# Patient Record
Sex: FEMALE | Race: WHITE | NOT HISPANIC OR LATINO | URBAN - METROPOLITAN AREA
[De-identification: names, ages, dates, MRNs, and addresses within clinical notes are randomized per-mention and may not be internally consistent; named-entity substitution may affect disease eponyms.]

---

## 2017-06-20 ENCOUNTER — EMERGENCY (EMERGENCY)
Facility: HOSPITAL | Age: 52
LOS: 1 days | Discharge: OTHER INSTITUTION | End: 2017-06-20
Attending: EMERGENCY MEDICINE
Payer: COMMERCIAL

## 2017-06-20 VITALS
HEART RATE: 130 BPM | TEMPERATURE: 100 F | RESPIRATION RATE: 20 BRPM | HEIGHT: 64 IN | DIASTOLIC BLOOD PRESSURE: 85 MMHG | WEIGHT: 154.98 LBS | SYSTOLIC BLOOD PRESSURE: 141 MMHG | OXYGEN SATURATION: 98 %

## 2017-06-20 LAB
ALBUMIN SERPL ELPH-MCNC: 4.3 G/DL — SIGNIFICANT CHANGE UP (ref 3.3–5.2)
ALP SERPL-CCNC: 79 U/L — SIGNIFICANT CHANGE UP (ref 40–120)
ALT FLD-CCNC: 16 U/L — SIGNIFICANT CHANGE UP
ANION GAP SERPL CALC-SCNC: 18 MMOL/L — HIGH (ref 5–17)
APPEARANCE UR: CLEAR — SIGNIFICANT CHANGE UP
AST SERPL-CCNC: 18 U/L — SIGNIFICANT CHANGE UP
BASOPHILS # BLD AUTO: 0 K/UL — SIGNIFICANT CHANGE UP (ref 0–0.2)
BASOPHILS NFR BLD AUTO: 0.2 % — SIGNIFICANT CHANGE UP (ref 0–2)
BILIRUB SERPL-MCNC: 0.8 MG/DL — SIGNIFICANT CHANGE UP (ref 0.4–2)
BILIRUB UR-MCNC: NEGATIVE — SIGNIFICANT CHANGE UP
BUN SERPL-MCNC: 10 MG/DL — SIGNIFICANT CHANGE UP (ref 8–20)
CALCIUM SERPL-MCNC: 9.7 MG/DL — SIGNIFICANT CHANGE UP (ref 8.6–10.2)
CHLORIDE SERPL-SCNC: 94 MMOL/L — LOW (ref 98–107)
CO2 SERPL-SCNC: 24 MMOL/L — SIGNIFICANT CHANGE UP (ref 22–29)
COLOR SPEC: YELLOW — SIGNIFICANT CHANGE UP
CREAT SERPL-MCNC: 0.55 MG/DL — SIGNIFICANT CHANGE UP (ref 0.5–1.3)
DIFF PNL FLD: ABNORMAL
EOSINOPHIL # BLD AUTO: 0 K/UL — SIGNIFICANT CHANGE UP (ref 0–0.5)
EOSINOPHIL NFR BLD AUTO: 0.1 % — SIGNIFICANT CHANGE UP (ref 0–6)
EPI CELLS # UR: SIGNIFICANT CHANGE UP
GLUCOSE SERPL-MCNC: 207 MG/DL — HIGH (ref 70–115)
GLUCOSE UR QL: NEGATIVE MG/DL — SIGNIFICANT CHANGE UP
HCT VFR BLD CALC: 40.5 % — SIGNIFICANT CHANGE UP (ref 37–47)
HGB BLD-MCNC: 13.6 G/DL — SIGNIFICANT CHANGE UP (ref 12–16)
KETONES UR-MCNC: NEGATIVE — SIGNIFICANT CHANGE UP
LACTATE BLDV-MCNC: 1.7 MMOL/L — SIGNIFICANT CHANGE UP (ref 0.5–2)
LEUKOCYTE ESTERASE UR-ACNC: ABNORMAL
LYMPHOCYTES # BLD AUTO: 14.2 % — LOW (ref 20–55)
LYMPHOCYTES # BLD AUTO: 2.5 K/UL — SIGNIFICANT CHANGE UP (ref 1–4.8)
MCHC RBC-ENTMCNC: 30.1 PG — SIGNIFICANT CHANGE UP (ref 27–31)
MCHC RBC-ENTMCNC: 33.6 G/DL — SIGNIFICANT CHANGE UP (ref 32–36)
MCV RBC AUTO: 89.6 FL — SIGNIFICANT CHANGE UP (ref 81–99)
MONOCYTES # BLD AUTO: 1.1 K/UL — HIGH (ref 0–0.8)
MONOCYTES NFR BLD AUTO: 6.4 % — SIGNIFICANT CHANGE UP (ref 3–10)
NEUTROPHILS # BLD AUTO: 13.8 K/UL — HIGH (ref 1.8–8)
NEUTROPHILS NFR BLD AUTO: 78.8 % — HIGH (ref 37–73)
NITRITE UR-MCNC: NEGATIVE — SIGNIFICANT CHANGE UP
PH UR: 7 — SIGNIFICANT CHANGE UP (ref 5–8)
PLATELET # BLD AUTO: 323 K/UL — SIGNIFICANT CHANGE UP (ref 150–400)
POTASSIUM SERPL-MCNC: 3.5 MMOL/L — SIGNIFICANT CHANGE UP (ref 3.5–5.3)
POTASSIUM SERPL-SCNC: 3.5 MMOL/L — SIGNIFICANT CHANGE UP (ref 3.5–5.3)
PROT SERPL-MCNC: 8.3 G/DL — SIGNIFICANT CHANGE UP (ref 6.6–8.7)
PROT UR-MCNC: NEGATIVE MG/DL — SIGNIFICANT CHANGE UP
RAPID RVP RESULT: SIGNIFICANT CHANGE UP
RBC # BLD: 4.52 M/UL — SIGNIFICANT CHANGE UP (ref 4.4–5.2)
RBC # FLD: 13.2 % — SIGNIFICANT CHANGE UP (ref 11–15.6)
RBC CASTS # UR COMP ASSIST: ABNORMAL /HPF (ref 0–4)
SODIUM SERPL-SCNC: 136 MMOL/L — SIGNIFICANT CHANGE UP (ref 135–145)
SP GR SPEC: 1 — LOW (ref 1.01–1.02)
UROBILINOGEN FLD QL: NEGATIVE MG/DL — SIGNIFICANT CHANGE UP
WBC # BLD: 17.5 K/UL — HIGH (ref 4.8–10.8)
WBC # FLD AUTO: 17.5 K/UL — HIGH (ref 4.8–10.8)
WBC UR QL: SIGNIFICANT CHANGE UP

## 2017-06-20 PROCEDURE — 70491 CT SOFT TISSUE NECK W/DYE: CPT | Mod: 26

## 2017-06-20 PROCEDURE — 71020: CPT | Mod: 26

## 2017-06-20 PROCEDURE — 99291 CRITICAL CARE FIRST HOUR: CPT

## 2017-06-20 RX ORDER — SODIUM CHLORIDE 9 MG/ML
500 INJECTION INTRAMUSCULAR; INTRAVENOUS; SUBCUTANEOUS
Qty: 0 | Refills: 0 | Status: COMPLETED | OUTPATIENT
Start: 2017-06-20 | End: 2017-06-20

## 2017-06-20 RX ORDER — ACETAMINOPHEN 500 MG
1000 TABLET ORAL ONCE
Qty: 0 | Refills: 0 | Status: COMPLETED | OUTPATIENT
Start: 2017-06-20 | End: 2017-06-20

## 2017-06-20 RX ORDER — SODIUM CHLORIDE 9 MG/ML
3 INJECTION INTRAMUSCULAR; INTRAVENOUS; SUBCUTANEOUS ONCE
Qty: 0 | Refills: 0 | Status: COMPLETED | OUTPATIENT
Start: 2017-06-20 | End: 2017-06-20

## 2017-06-20 RX ORDER — KETOROLAC TROMETHAMINE 30 MG/ML
30 SYRINGE (ML) INJECTION ONCE
Qty: 0 | Refills: 0 | Status: DISCONTINUED | OUTPATIENT
Start: 2017-06-20 | End: 2017-06-20

## 2017-06-20 RX ADMIN — SODIUM CHLORIDE 2000 MILLILITER(S): 9 INJECTION INTRAMUSCULAR; INTRAVENOUS; SUBCUTANEOUS at 22:16

## 2017-06-20 RX ADMIN — SODIUM CHLORIDE 2000 MILLILITER(S): 9 INJECTION INTRAMUSCULAR; INTRAVENOUS; SUBCUTANEOUS at 20:44

## 2017-06-20 RX ADMIN — SODIUM CHLORIDE 2000 MILLILITER(S): 9 INJECTION INTRAMUSCULAR; INTRAVENOUS; SUBCUTANEOUS at 22:15

## 2017-06-20 RX ADMIN — Medication 400 MILLIGRAM(S): at 20:44

## 2017-06-20 RX ADMIN — SODIUM CHLORIDE 2000 MILLILITER(S): 9 INJECTION INTRAMUSCULAR; INTRAVENOUS; SUBCUTANEOUS at 20:05

## 2017-06-20 RX ADMIN — Medication 100 MILLIGRAM(S): at 20:44

## 2017-06-20 RX ADMIN — SODIUM CHLORIDE 3 MILLILITER(S): 9 INJECTION INTRAMUSCULAR; INTRAVENOUS; SUBCUTANEOUS at 19:47

## 2017-06-20 NOTE — ED PROVIDER NOTE - OBJECTIVE STATEMENT
50 y/o female presents to the ED c/o difficulty swallowing that onset 4 days ago. Pt states that she has been having difficulty swallowing and that cold water makes sx better. Went to her PCP about 1.5 weeks ago and was diagnosed with shingles due to a rash on her back. She was started on abx. Went to [facility] 2 weeks ago. Denies difficulty breathing, numbness, tingling, fever, chills or HA. No further complaints at this time.

## 2017-06-20 NOTE — ED PROVIDER NOTE - PROGRESS NOTE DETAILS
Spoke with Dr. Gupta, ENT at [facility]. Awaiting call back. Will transfer when callback. Dr. Moncada will be accepting physician.

## 2017-06-20 NOTE — ED PROVIDER NOTE - CRITICAL CARE PROVIDED
documentation/consult w/ pt's family directly relating to pts condition/additional history taking/consultation with other physicians/interpretation of diagnostic studies/direct patient care (not related to procedure)

## 2017-06-20 NOTE — ED ADULT NURSE NOTE - OBJECTIVE STATEMENT
patient states that she has difficulty swallowing which started on friday and increased in difficulty, she states that she was unable to eat today. Patient states that she was also diagnosed with shingles 2 weeks ago

## 2017-06-20 NOTE — ED ADULT NURSE REASSESSMENT NOTE - NS ED NURSE REASSESS COMMENT FT1
Pt resting comfortably on stretcher, A&Ox3, reports feeling better than before, VSS, family at bedside providing support, POC discussed, awaiting transport to CT, CT called transport in progress. Safety and comfort measures in place.

## 2017-06-21 ENCOUNTER — INPATIENT (INPATIENT)
Facility: HOSPITAL | Age: 52
LOS: 0 days | Discharge: HOME | End: 2017-06-22
Attending: OTOLARYNGOLOGY | Admitting: OTOLARYNGOLOGY

## 2017-06-21 VITALS
HEART RATE: 102 BPM | TEMPERATURE: 99 F | RESPIRATION RATE: 20 BRPM | DIASTOLIC BLOOD PRESSURE: 71 MMHG | OXYGEN SATURATION: 97 % | SYSTOLIC BLOOD PRESSURE: 125 MMHG

## 2017-06-21 VITALS
RESPIRATION RATE: 16 BRPM | SYSTOLIC BLOOD PRESSURE: 148 MMHG | TEMPERATURE: 98 F | OXYGEN SATURATION: 100 % | HEART RATE: 75 BPM | DIASTOLIC BLOOD PRESSURE: 99 MMHG

## 2017-06-21 DIAGNOSIS — R22.1 LOCALIZED SWELLING, MASS AND LUMP, NECK: ICD-10-CM

## 2017-06-21 PROCEDURE — 87486 CHLMYD PNEUM DNA AMP PROBE: CPT

## 2017-06-21 PROCEDURE — 70491 CT SOFT TISSUE NECK W/DYE: CPT

## 2017-06-21 PROCEDURE — 80053 COMPREHEN METABOLIC PANEL: CPT

## 2017-06-21 PROCEDURE — 96374 THER/PROPH/DIAG INJ IV PUSH: CPT | Mod: XU

## 2017-06-21 PROCEDURE — 99285 EMERGENCY DEPT VISIT HI MDM: CPT | Mod: 25

## 2017-06-21 PROCEDURE — 96375 TX/PRO/DX INJ NEW DRUG ADDON: CPT | Mod: XU

## 2017-06-21 PROCEDURE — 85027 COMPLETE CBC AUTOMATED: CPT

## 2017-06-21 PROCEDURE — 87633 RESP VIRUS 12-25 TARGETS: CPT

## 2017-06-21 PROCEDURE — 81001 URINALYSIS AUTO W/SCOPE: CPT

## 2017-06-21 PROCEDURE — 87040 BLOOD CULTURE FOR BACTERIA: CPT

## 2017-06-21 PROCEDURE — 87798 DETECT AGENT NOS DNA AMP: CPT

## 2017-06-21 PROCEDURE — 83605 ASSAY OF LACTIC ACID: CPT

## 2017-06-21 PROCEDURE — 71046 X-RAY EXAM CHEST 2 VIEWS: CPT

## 2017-06-21 PROCEDURE — 36415 COLL VENOUS BLD VENIPUNCTURE: CPT

## 2017-06-21 PROCEDURE — 87086 URINE CULTURE/COLONY COUNT: CPT

## 2017-06-21 PROCEDURE — 87581 M.PNEUMON DNA AMP PROBE: CPT

## 2017-06-21 RX ORDER — DEXAMETHASONE 0.5 MG/5ML
10 ELIXIR ORAL ONCE
Qty: 0 | Refills: 0 | Status: COMPLETED | OUTPATIENT
Start: 2017-06-21 | End: 2017-06-21

## 2017-06-21 RX ORDER — MORPHINE SULFATE 50 MG/1
3 CAPSULE, EXTENDED RELEASE ORAL EVERY 4 HOURS
Qty: 0 | Refills: 0 | Status: DISCONTINUED | OUTPATIENT
Start: 2017-06-21 | End: 2017-06-22

## 2017-06-21 RX ORDER — DEXAMETHASONE 0.5 MG/5ML
8 ELIXIR ORAL EVERY 8 HOURS
Qty: 0 | Refills: 0 | Status: COMPLETED | OUTPATIENT
Start: 2017-06-21 | End: 2017-06-22

## 2017-06-21 RX ORDER — HEPARIN SODIUM 5000 [USP'U]/ML
5000 INJECTION INTRAVENOUS; SUBCUTANEOUS EVERY 12 HOURS
Qty: 0 | Refills: 0 | Status: DISCONTINUED | OUTPATIENT
Start: 2017-06-21 | End: 2017-06-22

## 2017-06-21 RX ORDER — LIDOCAINE HCL 20 MG/ML
3 VIAL (ML) INJECTION ONCE
Qty: 0 | Refills: 0 | Status: COMPLETED | OUTPATIENT
Start: 2017-06-21 | End: 2017-06-21

## 2017-06-21 RX ORDER — MORPHINE SULFATE 50 MG/1
2 CAPSULE, EXTENDED RELEASE ORAL EVERY 4 HOURS
Qty: 0 | Refills: 0 | Status: DISCONTINUED | OUTPATIENT
Start: 2017-06-21 | End: 2017-06-22

## 2017-06-21 RX ORDER — SODIUM CHLORIDE 9 MG/ML
1000 INJECTION, SOLUTION INTRAVENOUS
Qty: 0 | Refills: 0 | Status: DISCONTINUED | OUTPATIENT
Start: 2017-06-21 | End: 2017-06-22

## 2017-06-21 RX ORDER — MORPHINE SULFATE 50 MG/1
4 CAPSULE, EXTENDED RELEASE ORAL EVERY 4 HOURS
Qty: 0 | Refills: 0 | Status: DISCONTINUED | OUTPATIENT
Start: 2017-06-21 | End: 2017-06-22

## 2017-06-21 RX ADMIN — Medication 101.6 MILLIGRAM(S): at 22:37

## 2017-06-21 RX ADMIN — Medication 100 MILLIGRAM(S): at 13:39

## 2017-06-21 RX ADMIN — Medication 101.6 MILLIGRAM(S): at 06:30

## 2017-06-21 RX ADMIN — SODIUM CHLORIDE 100 MILLILITER(S): 9 INJECTION, SOLUTION INTRAVENOUS at 05:16

## 2017-06-21 RX ADMIN — SODIUM CHLORIDE 100 MILLILITER(S): 9 INJECTION, SOLUTION INTRAVENOUS at 18:50

## 2017-06-21 RX ADMIN — Medication 10 MILLIGRAM(S): at 01:19

## 2017-06-21 RX ADMIN — HEPARIN SODIUM 5000 UNIT(S): 5000 INJECTION INTRAVENOUS; SUBCUTANEOUS at 18:20

## 2017-06-21 RX ADMIN — Medication 101.6 MILLIGRAM(S): at 14:39

## 2017-06-21 RX ADMIN — Medication 100 MILLIGRAM(S): at 22:37

## 2017-06-21 RX ADMIN — HEPARIN SODIUM 5000 UNIT(S): 5000 INJECTION INTRAVENOUS; SUBCUTANEOUS at 06:31

## 2017-06-21 RX ADMIN — Medication 100 MILLIGRAM(S): at 07:13

## 2017-06-21 RX ADMIN — Medication 3 MILLILITER(S): at 04:00

## 2017-06-21 NOTE — DISCHARGE NOTE ADULT - CARE PLAN
Principal Discharge DX:	Neck swelling  Goal:	IV antibiotics  Instructions for follow-up, activity and diet:	diet: Principal Discharge DX:	Neck swelling  Goal:	IV antibiotics  Instructions for follow-up, activity and diet:	diet: soft

## 2017-06-21 NOTE — ED PROVIDER NOTE - ATTENDING CONTRIBUTION TO CARE
Dr. Griffith: I performed a face to face bedside interview with patient regarding history of present illness, review of symptoms and past medical history. I completed an independent physical exam.  I have discussed patient's plan of care with PA.   I agree with note as stated above, having amended the EMR as needed to reflect my findings.   This includes HISTORY OF PRESENT ILLNESS, HIV, PAST MEDICAL/SURGICAL/FAMILY/SOCIAL HISTORY, ALLERGIES AND HOME MEDICATIONS, REVIEW OF SYSTEMS, PHYSICAL EXAM, and any PROGRESS NOTES during the time I functioned as the attending physician for this patient.    52 yo F with neck swelling Dr. Griffith: I performed a face to face bedside interview with patient regarding history of present illness, review of symptoms and past medical history. I completed an independent physical exam.  I have discussed patient's plan of care with PA.   I agree with note as stated above, having amended the EMR as needed to reflect my findings.   This includes HISTORY OF PRESENT ILLNESS, HIV, PAST MEDICAL/SURGICAL/FAMILY/SOCIAL HISTORY, ALLERGIES AND HOME MEDICATIONS, REVIEW OF SYSTEMS, PHYSICAL EXAM, and any PROGRESS NOTES during the time I functioned as the attending physician for this patient.    50 yo F with neck swelling. no stridor or respiratory distress pt has trismius   ***GEN - NAD; well appearing; A+O x3 ***HEAD - NC/AT ***EYES/NOSE - PEERL, EOMI, mucous membranes moist, no discharge ***THROAT: difficult to exam due to limited opening of mouth no drooling .  ***NECK: Neck supple, edema to the left neck and the mandibular area  ***PULMONARY - CTA b/l, symmetric breath sounds. ***CARDIAC -s1s2, RRR, no M,G,R  ***ABDOMEN - +BS, ND, NT, soft***EXTREMITIES - symmetric pulses no edema ***SKIN - no rash or bruising ***NEUROLOGIC - alert, follows commands ***PSYCH - insight and judgment nl  plan ENT consulted and will admit pt

## 2017-06-21 NOTE — DISCHARGE NOTE ADULT - INSTRUCTIONS
discharged to home with written prescriptions for percocet, medrol dose pack and clindamycin. will follow up as an outpatient. may return to ER for shortness of breath. continue with soft diet

## 2017-06-21 NOTE — DISCHARGE NOTE ADULT - MEDICATION SUMMARY - MEDICATIONS TO TAKE
I will START or STAY ON the medications listed below when I get home from the hospital:    Medrol 2 mg oral tablet  -- use as directed on script  -- It is very important that you take or use this exactly as directed.  Do not skip doses or discontinue unless directed by your doctor.  Obtain medical advice before taking any non-prescription drugs as some may affect the action of this medication.  Take with food or milk.    -- Indication: For swelling    Percocet 5/325 325 mg-5 mg oral tablet  -- 1 tab(s) by mouth every 6 hours MDD:4  -- Caution federal law prohibits the transfer of this drug to any person other  than the person for whom it was prescribed.  May cause drowsiness.  Alcohol may intensify this effect.  Use care when operating dangerous machinery.  This prescription cannot be refilled.  This product contains acetaminophen.  Do not use  with any other product containing acetaminophen to prevent possible liver damage.  Using more of this medication than prescribed may cause serious breathing problems.    -- Indication: For pain    valACYclovir 1 g oral tablet  -- 1 tab(s) by mouth every 8 hours  -- Indication: For outpt med    Cleocin HCl 300 mg oral capsule  -- 1 cap(s) by mouth every 6 hours  -- Finish all this medication unless otherwise directed by prescriber.  Medication should be taken with plenty of water.    -- Indication: For prophylaxis

## 2017-06-21 NOTE — DISCHARGE NOTE ADULT - CARE PROVIDER_API CALL
Jax Gupta), Otolaryngology  [address] [place] [address]  Phone: [phone]  Fax: [phone]

## 2017-06-21 NOTE — ED PROVIDER NOTE - OBJECTIVE STATEMENT
50 y/o female no pmh ALL: to [facility] presents s/p transfer from [place], states that she has had some left neck swelling x 4 days, thought that she had a fat neck, but today was worse + difficultly swallowing and more pain so went to ER, there CT svan showed a peripharyngeal fluid collection + swelling of epiglottitis, + leukocytosis of 17. pt denies any headaches, neck pain, f/c/n/v/d, diff breathing, chest pain, sob, abdominal pain, urinary symptoms, numbness/weakness/tingling, recent travel, sick contact, social history. was given clinda + decadron before coming.

## 2017-06-21 NOTE — DISCHARGE NOTE ADULT - HOSPITAL COURSE
51 year old female with parapharyngeal phlegmon. Treated with IV antibiotics... 51 year old female with parapharyngeal phlegmon. Treated with IV antibiotics and then discharged home on oral clindamycin on 6/22/17.

## 2017-06-21 NOTE — H&P ADULT - HISTORY OF PRESENT ILLNESS
50 y/o female no pmh  presents s/p transfer from [place], states that she has had some left neck swelling x 4 days, today had worsening swallowing and more pain so went to ER.    Pt denies any headaches, neck pain, f/c/n/v/d, diff breathing, chest pain, sob, abdominal pain, urinary symptoms, numbness/weakness/tingling, recent travel, sick contact, social history. was given clinda + decadron before coming.    No difficulty breathing, does have odynophagia.    CT shows diffuse thickening without discrete abscess.    ALL: Penicillin    REVIEW OF SYSTEMS:  As in HPI                        13.6   17.5  )-----------( 323      ( 20 Jun 2017 19:58 )             40.5     06-20    136  |  94<L>  |  10.0  ----------------------------<  207<H>  3.5   |  24.0  |  0.55    Ca    9.7      20 Jun 2017 19:58    TPro  8.3  /  Alb  4.3  /  TBili  0.8  /  DBili  x   /  AST  18  /  ALT  16  /  AlkPhos  79  06-20    Vital Signs Last 24 Hrs  T(C): 36.7, Max: 36.7 (06-21 @ 02:41)  T(F): 98.1, Max: 98.1 (06-21 @ 02:41)  HR: 75 (75 - 75)  BP: 148/99 (148/99 - 148/99)    PHYSICAL EXAM:  Constitutional Normal: well nourished, well developed, non-dysmorphic, no acute distress  Psychiatric: age appropriate behavior, cooperative  Skin: no obvious skin lesions    Left EAC: Normal, No cerumen, no exostoses   Right EAC: Normal, No cerumen, no exostoses     Left Tympanic Membrane: Normal, Clear, No effusion  Right Tympanic Membrane: Normal, Clear, No effusion			    External Nose:  Normal, no structural deformities  Anterior Nasal Cavity:	Normal mucosa, no turbinate hypertrophy, straight septum  Oral Cavity:  Good dentition, tongue midline, L Palatal bulge noted, uvula deviated to R. ++ Trismus  Neck: + [place] LAD BL  Pulmonary: No Acute Distress.   Neurologic: awake and alert    Fiberoptic Laryngoscopy-  Adenoids nonhypertrophied and non edematous, epiglottis with mild lingual edema, vocal cords mobile bilaterally with no lesions or edema    3 cc 1% lidocaine with 1:100,000 epi injected in L palate. Incision made, clamp used to further open. No purulent material released. Pt tolerated attempt at drainage well.    A/P: 52 yo F with L parapharyngeal phlegmon vs. pharyngitis  -Clinda  -Decadron  -admit for obs  -d/w attending

## 2017-06-21 NOTE — ED ADULT NURSE NOTE - ED STAT RN HANDOFF DETAILS
Report given to LES Mitchell in RADS 1, pt. appears stable, no respiratory distress noted at this time.  Pt. awaiting transport to the unit.

## 2017-06-21 NOTE — ED ADULT TRIAGE NOTE - CHIEF COMPLAINT QUOTE
Pt arrives as transfer from [facility] for ENT. Accepted by Dr. Gupta. Recently diagnosed with shingles and presents w/ throat swelling causing difficulty swallowing.

## 2017-06-21 NOTE — ED ADULT NURSE REASSESSMENT NOTE - NS ED NURSE REASSESS COMMENT FT1
Report given to accepting RN, Loren [facility] ED, pt understands POC to be transferred and agrees with POC, pt made call to family who will visit in the am.

## 2017-06-21 NOTE — DISCHARGE NOTE ADULT - PATIENT PORTAL LINK FT
“You can access the [employer] Patient Portal, offered by [facility], by registering with the following website: [email or web address]”

## 2017-06-21 NOTE — ED ADULT NURSE NOTE - OBJECTIVE STATEMENT
rec'd pt as a xfer from [facility] for an ENT consult for throat swelling.  Pt.'s neck appears swollen upon arrival, no stridor or respiratory distress noted, states swelling started approximately 6 days ago and has since progressed.  Pt reports pain when swallowing but denies SOB.  Pt. arrives with a 20G IV saline lock in the R wrist and a 20G IV saline lock to the L AC.  Pt. denies fever/chills.  Vital signs stable. Pt. awaiting ENT consult.  Will continue to monitor.

## 2017-06-22 VITALS
SYSTOLIC BLOOD PRESSURE: 120 MMHG | OXYGEN SATURATION: 95 % | DIASTOLIC BLOOD PRESSURE: 73 MMHG | HEART RATE: 95 BPM | RESPIRATION RATE: 17 BRPM | TEMPERATURE: 98 F

## 2017-06-22 LAB
BUN SERPL-MCNC: 9 MG/DL — SIGNIFICANT CHANGE UP (ref 7–23)
CALCIUM SERPL-MCNC: 9.2 MG/DL — SIGNIFICANT CHANGE UP (ref 8.4–10.5)
CHLORIDE SERPL-SCNC: 100 MMOL/L — SIGNIFICANT CHANGE UP (ref 98–107)
CO2 SERPL-SCNC: 24 MMOL/L — SIGNIFICANT CHANGE UP (ref 22–31)
CREAT SERPL-MCNC: 0.6 MG/DL — SIGNIFICANT CHANGE UP (ref 0.5–1.3)
CULTURE RESULTS: SIGNIFICANT CHANGE UP
GLUCOSE SERPL-MCNC: 193 MG/DL — HIGH (ref 70–99)
HCT VFR BLD CALC: 34.8 % — SIGNIFICANT CHANGE UP (ref 34.5–45)
HGB BLD-MCNC: 11.1 G/DL — LOW (ref 11.5–15.5)
MAGNESIUM SERPL-MCNC: 2 MG/DL — SIGNIFICANT CHANGE UP (ref 1.6–2.6)
MCHC RBC-ENTMCNC: 29.1 PG — SIGNIFICANT CHANGE UP (ref 27–34)
MCHC RBC-ENTMCNC: 31.9 % — LOW (ref 32–36)
MCV RBC AUTO: 91.3 FL — SIGNIFICANT CHANGE UP (ref 80–100)
PHOSPHATE SERPL-MCNC: 3.3 MG/DL — SIGNIFICANT CHANGE UP (ref 2.5–4.5)
PLATELET # BLD AUTO: 329 K/UL — SIGNIFICANT CHANGE UP (ref 150–400)
PMV BLD: 10.6 FL — SIGNIFICANT CHANGE UP (ref 7–13)
POTASSIUM SERPL-MCNC: 3.5 MMOL/L — SIGNIFICANT CHANGE UP (ref 3.5–5.3)
POTASSIUM SERPL-SCNC: 3.5 MMOL/L — SIGNIFICANT CHANGE UP (ref 3.5–5.3)
RBC # BLD: 3.81 M/UL — SIGNIFICANT CHANGE UP (ref 3.8–5.2)
RBC # FLD: 13.1 % — SIGNIFICANT CHANGE UP (ref 10.3–14.5)
SODIUM SERPL-SCNC: 142 MMOL/L — SIGNIFICANT CHANGE UP (ref 135–145)
SPECIMEN SOURCE: SIGNIFICANT CHANGE UP
WBC # BLD: 21.51 K/UL — HIGH (ref 3.8–10.5)
WBC # FLD AUTO: 21.51 K/UL — HIGH (ref 3.8–10.5)

## 2017-06-22 RX ADMIN — HEPARIN SODIUM 5000 UNIT(S): 5000 INJECTION INTRAVENOUS; SUBCUTANEOUS at 06:08

## 2017-06-22 RX ADMIN — Medication 101.6 MILLIGRAM(S): at 06:08

## 2017-06-22 RX ADMIN — Medication 100 MILLIGRAM(S): at 06:08

## 2017-06-22 RX ADMIN — Medication 100 MILLIGRAM(S): at 13:30

## 2017-06-22 NOTE — PROGRESS NOTE ADULT - SUBJECTIVE AND OBJECTIVE BOX
sen and examined  pain improving  tolerating CLD    nad  awake, alert  +trimus but improved  OC/OP: no soft palate swelling, no bleeding, improved L pharyngeal swelling  neck: soft, flat    A/P:  -continue clinda  -advance diet as tolerated  -pain control  -dc later today

## 2017-06-25 LAB
CULTURE RESULTS: SIGNIFICANT CHANGE UP
CULTURE RESULTS: SIGNIFICANT CHANGE UP
SPECIMEN SOURCE: SIGNIFICANT CHANGE UP
SPECIMEN SOURCE: SIGNIFICANT CHANGE UP

## 2017-07-01 RX ORDER — AZITHROMYCIN 500 MG/1
0 TABLET, FILM COATED ORAL
Qty: 0 | Refills: 0 | COMMUNITY

## 2017-07-11 ENCOUNTER — FORM ENCOUNTER (OUTPATIENT)
Age: 52
End: 2017-07-11

## 2017-07-12 ENCOUNTER — OUTPATIENT (OUTPATIENT)
Dept: OUTPATIENT SERVICES | Facility: HOSPITAL | Age: 52
LOS: 1 days | End: 2017-07-12
Payer: COMMERCIAL

## 2017-07-12 ENCOUNTER — APPOINTMENT (OUTPATIENT)
Dept: OTOLARYNGOLOGY | Facility: CLINIC | Age: 52
End: 2017-07-12

## 2017-07-12 ENCOUNTER — APPOINTMENT (OUTPATIENT)
Dept: ULTRASOUND IMAGING | Facility: IMAGING CENTER | Age: 52
End: 2017-07-12

## 2017-07-12 VITALS
HEIGHT: 63 IN | WEIGHT: 155 LBS | HEART RATE: 89 BPM | DIASTOLIC BLOOD PRESSURE: 86 MMHG | SYSTOLIC BLOOD PRESSURE: 136 MMHG | BODY MASS INDEX: 27.46 KG/M2 | RESPIRATION RATE: 18 BRPM

## 2017-07-12 DIAGNOSIS — E04.2 NONTOXIC MULTINODULAR GOITER: ICD-10-CM

## 2017-07-12 DIAGNOSIS — Z87.730 PERSONAL HISTORY OF (CORRECTED) CLEFT LIP AND PALATE: ICD-10-CM

## 2017-07-12 DIAGNOSIS — Z80.1 FAMILY HISTORY OF MALIGNANT NEOPLASM OF TRACHEA, BRONCHUS AND LUNG: ICD-10-CM

## 2017-07-12 PROCEDURE — 76536 US EXAM OF HEAD AND NECK: CPT

## 2017-07-12 RX ORDER — CLINDAMYCIN HYDROCHLORIDE 300 MG/1
300 CAPSULE ORAL
Qty: 40 | Refills: 0 | Status: COMPLETED | COMMUNITY
Start: 2017-06-22

## 2017-07-12 RX ORDER — METHYLPREDNISOLONE 4 MG/1
4 TABLET ORAL
Qty: 21 | Refills: 0 | Status: COMPLETED | COMMUNITY
Start: 2017-06-22

## 2017-07-12 RX ORDER — VALACYCLOVIR 1 G/1
1 TABLET, FILM COATED ORAL
Qty: 30 | Refills: 0 | Status: COMPLETED | COMMUNITY
Start: 2017-06-09

## 2017-08-09 ENCOUNTER — FORM ENCOUNTER (OUTPATIENT)
Age: 52
End: 2017-08-09

## 2017-08-10 ENCOUNTER — OUTPATIENT (OUTPATIENT)
Dept: OUTPATIENT SERVICES | Facility: HOSPITAL | Age: 52
LOS: 1 days | End: 2017-08-10
Payer: COMMERCIAL

## 2017-08-10 ENCOUNTER — RESULT REVIEW (OUTPATIENT)
Age: 52
End: 2017-08-10

## 2017-08-10 ENCOUNTER — APPOINTMENT (OUTPATIENT)
Dept: ULTRASOUND IMAGING | Facility: IMAGING CENTER | Age: 52
End: 2017-08-10
Payer: COMMERCIAL

## 2017-08-10 DIAGNOSIS — E04.2 NONTOXIC MULTINODULAR GOITER: ICD-10-CM

## 2017-08-10 PROCEDURE — 10022: CPT

## 2017-08-10 PROCEDURE — 76942 ECHO GUIDE FOR BIOPSY: CPT | Mod: 26

## 2017-08-10 PROCEDURE — 76942 ECHO GUIDE FOR BIOPSY: CPT

## 2017-08-10 PROCEDURE — 88173 CYTOPATH EVAL FNA REPORT: CPT | Mod: 26

## 2017-08-11 ENCOUNTER — APPOINTMENT (OUTPATIENT)
Dept: OTOLARYNGOLOGY | Facility: CLINIC | Age: 52
End: 2017-08-11
Payer: COMMERCIAL

## 2017-08-11 VITALS
BODY MASS INDEX: 27.46 KG/M2 | DIASTOLIC BLOOD PRESSURE: 88 MMHG | SYSTOLIC BLOOD PRESSURE: 136 MMHG | HEART RATE: 89 BPM | HEIGHT: 63 IN | WEIGHT: 155 LBS

## 2017-08-11 DIAGNOSIS — J06.9 ACUTE UPPER RESPIRATORY INFECTION, UNSPECIFIED: ICD-10-CM

## 2017-08-11 DIAGNOSIS — Z09 ENCOUNTER FOR FOLLOW-UP EXAMINATION AFTER COMPLETED TREATMENT FOR CONDITIONS OTHER THAN MALIGNANT NEOPLASM: ICD-10-CM

## 2017-08-11 PROCEDURE — 31575 DIAGNOSTIC LARYNGOSCOPY: CPT

## 2017-08-11 PROCEDURE — 99215 OFFICE O/P EST HI 40 MIN: CPT | Mod: 25

## 2017-08-11 RX ORDER — ERYTHROMYCIN 333 MG/1
333 TABLET, DELAYED RELEASE ORAL 3 TIMES DAILY
Qty: 30 | Refills: 1 | Status: COMPLETED | COMMUNITY
Start: 2017-07-12 | End: 2017-08-11

## 2017-08-14 LAB — NON-GYNECOLOGICAL CYTOLOGY STUDY: SIGNIFICANT CHANGE UP

## 2018-05-01 NOTE — ED PROVIDER NOTE - ATTESTATION, MLM
Transport delayed and patient is stable.  ON room air tolerating liquids and snacks.  Discharge paperwork completed and reviewed with patient and wife.  Patient taken out via wheelchair.   I have reviewed and confirmed nurses' notes for patient's medications, allergies, medical history, and surgical history.

## 2018-10-03 NOTE — ED PROVIDER NOTE - NS ED NOTE AC HIGH RISK COUNTRIES
No Physician Pre-Sedation Assessment    Pre-Sedation Assessment:    Sedation History: Airway Assessed    Cardiac: normal S1, S2  Respiratory: breath sounds clear bilaterally   Abdomen: soft, BS (+), non-tender    ASA Classification: 3.  Patient with severe sys

## 2019-02-27 ENCOUNTER — APPOINTMENT (OUTPATIENT)
Dept: OTOLARYNGOLOGY | Facility: CLINIC | Age: 54
End: 2019-02-27
Payer: COMMERCIAL

## 2019-02-27 VITALS
SYSTOLIC BLOOD PRESSURE: 145 MMHG | HEIGHT: 63 IN | DIASTOLIC BLOOD PRESSURE: 96 MMHG | BODY MASS INDEX: 26.05 KG/M2 | WEIGHT: 147 LBS | HEART RATE: 98 BPM

## 2019-02-27 DIAGNOSIS — R59.9 ENLARGED LYMPH NODES, UNSPECIFIED: ICD-10-CM

## 2019-02-27 DIAGNOSIS — J34.2 DEVIATED NASAL SEPTUM: ICD-10-CM

## 2019-02-27 DIAGNOSIS — E04.2 NONTOXIC MULTINODULAR GOITER: ICD-10-CM

## 2019-02-27 DIAGNOSIS — J98.8 OTHER SPECIFIED RESPIRATORY DISORDERS: ICD-10-CM

## 2019-02-27 PROCEDURE — 31575 DIAGNOSTIC LARYNGOSCOPY: CPT

## 2019-02-27 PROCEDURE — 99214 OFFICE O/P EST MOD 30 MIN: CPT | Mod: 25

## 2019-02-27 RX ORDER — FLUTICASONE PROPIONATE 50 UG/1
50 SPRAY, METERED NASAL DAILY
Qty: 1 | Refills: 5 | Status: DISCONTINUED | COMMUNITY
Start: 2017-07-12 | End: 2019-02-27

## 2019-02-27 RX ORDER — ACYCLOVIR 50 MG/G
5 OINTMENT TOPICAL
Qty: 15 | Refills: 0 | Status: DISCONTINUED | COMMUNITY
Start: 2017-06-09 | End: 2019-02-27

## 2019-02-27 NOTE — HISTORY OF PRESENT ILLNESS
[Snoring] : snoring [Witnessed Apneas] : witnessed sleep apnea [de-identified] : 53 year old female pre surgical visit scheduled for septoplasty and turbinectomy 4/23/17.  States cleft palate repaired as a young child.  States not having any difficulties breathing through the nose.  States her  told her she stops breathing at night.  States when she was in the hospital last year she was told her oxygen levels dropped dramatically during sleep.  Denies sinus infections in the past year.

## 2019-02-27 NOTE — REASON FOR VISIT
[Subsequent Evaluation] : a subsequent evaluation for [Other: _____] : [unfilled] [FreeTextEntry2] : pre surgical visit scheduled for surgery 4/23/19

## 2019-02-27 NOTE — PHYSICAL EXAM
[de-identified] : thick, no chin [de-identified] : nodules in thyroid,  [Nasal Endoscopy Performed] : nasal endoscopy was performed, see procedure section for findings [] : septum deviated bilaterally [de-identified] : swollen [Midline] : trachea located in midline position [Laryngoscopy Performed] : laryngoscopy was performed, see procedure section for findings [de-identified] : 3+  [Normal] : no rashes [de-identified] : multi nodular

## 2019-02-27 NOTE — PROCEDURE
[Image(s) Captured] : image(s) captured and filed [Unable to Cooperate with Mirror] : patient unable to cooperate with mirror [Obstruction] : acute airway obstruction evaluation [Complicated Symptoms] : complicated symptoms requiring more thorough examination than provided by mirror [Topical Lidocaine] : topical lidocaine [Oxymetazoline HCl] : oxymetazoline HCl [Flexible Endoscope] : examined with the flexible endoscope [Serial Number: ___] : Serial Number: [unfilled] [Present] : absent [Velopharynx ___ %] : the velopharynx was [unfilled]U% collapsed [Normal] : normal [de-identified] : Patient was placed in the examination chair in a sitting position. The nose was decongested with oxymetazoline nasal solution. The airway was anesthetized with 4% Xylocaine.  The back of the throat was anesthetized with Cetacaine. Direct flexible/rigid video endoscopy was performed. Findings revealed:\par Bilaterally severely deviated nasal septum no percent obstruction of the nasal fossa. Nasopharynx and a positive Müller maneuver on inspiration

## 2019-04-01 ENCOUNTER — OUTPATIENT (OUTPATIENT)
Dept: OUTPATIENT SERVICES | Facility: HOSPITAL | Age: 54
LOS: 1 days | End: 2019-04-01
Payer: COMMERCIAL

## 2019-04-01 ENCOUNTER — APPOINTMENT (OUTPATIENT)
Dept: SLEEP CENTER | Facility: CLINIC | Age: 54
End: 2019-04-01
Payer: COMMERCIAL

## 2019-04-01 PROCEDURE — 95810 POLYSOM 6/> YRS 4/> PARAM: CPT

## 2019-04-01 PROCEDURE — 95810 POLYSOM 6/> YRS 4/> PARAM: CPT | Mod: 26

## 2019-04-02 DIAGNOSIS — G47.33 OBSTRUCTIVE SLEEP APNEA (ADULT) (PEDIATRIC): ICD-10-CM

## 2019-04-09 ENCOUNTER — OUTPATIENT (OUTPATIENT)
Dept: OUTPATIENT SERVICES | Facility: HOSPITAL | Age: 54
LOS: 1 days | End: 2019-04-09

## 2019-04-09 VITALS
DIASTOLIC BLOOD PRESSURE: 80 MMHG | HEART RATE: 77 BPM | OXYGEN SATURATION: 98 % | RESPIRATION RATE: 16 BRPM | WEIGHT: 151.9 LBS | TEMPERATURE: 98 F | HEIGHT: 62 IN | SYSTOLIC BLOOD PRESSURE: 120 MMHG

## 2019-04-09 DIAGNOSIS — J34.3 HYPERTROPHY OF NASAL TURBINATES: ICD-10-CM

## 2019-04-09 DIAGNOSIS — Z87.730 PERSONAL HISTORY OF (CORRECTED) CLEFT LIP AND PALATE: Chronic | ICD-10-CM

## 2019-04-09 LAB
ANION GAP SERPL CALC-SCNC: 14 MMO/L — SIGNIFICANT CHANGE UP (ref 7–14)
BUN SERPL-MCNC: 19 MG/DL — SIGNIFICANT CHANGE UP (ref 7–23)
CALCIUM SERPL-MCNC: 9.9 MG/DL — SIGNIFICANT CHANGE UP (ref 8.4–10.5)
CHLORIDE SERPL-SCNC: 98 MMOL/L — SIGNIFICANT CHANGE UP (ref 98–107)
CO2 SERPL-SCNC: 27 MMOL/L — SIGNIFICANT CHANGE UP (ref 22–31)
CREAT SERPL-MCNC: 0.62 MG/DL — SIGNIFICANT CHANGE UP (ref 0.5–1.3)
GLUCOSE SERPL-MCNC: 91 MG/DL — SIGNIFICANT CHANGE UP (ref 70–99)
HCT VFR BLD CALC: 43.2 % — SIGNIFICANT CHANGE UP (ref 34.5–45)
HGB BLD-MCNC: 13.3 G/DL — SIGNIFICANT CHANGE UP (ref 11.5–15.5)
MCHC RBC-ENTMCNC: 28.5 PG — SIGNIFICANT CHANGE UP (ref 27–34)
MCHC RBC-ENTMCNC: 30.8 % — LOW (ref 32–36)
MCV RBC AUTO: 92.5 FL — SIGNIFICANT CHANGE UP (ref 80–100)
NRBC # FLD: 0 K/UL — SIGNIFICANT CHANGE UP (ref 0–0)
PLATELET # BLD AUTO: 295 K/UL — SIGNIFICANT CHANGE UP (ref 150–400)
PMV BLD: 10.8 FL — SIGNIFICANT CHANGE UP (ref 7–13)
POTASSIUM SERPL-MCNC: 4.3 MMOL/L — SIGNIFICANT CHANGE UP (ref 3.5–5.3)
POTASSIUM SERPL-SCNC: 4.3 MMOL/L — SIGNIFICANT CHANGE UP (ref 3.5–5.3)
RBC # BLD: 4.67 M/UL — SIGNIFICANT CHANGE UP (ref 3.8–5.2)
RBC # FLD: 12.2 % — SIGNIFICANT CHANGE UP (ref 10.3–14.5)
SODIUM SERPL-SCNC: 139 MMOL/L — SIGNIFICANT CHANGE UP (ref 135–145)
WBC # BLD: 6.85 K/UL — SIGNIFICANT CHANGE UP (ref 3.8–10.5)
WBC # FLD AUTO: 6.85 K/UL — SIGNIFICANT CHANGE UP (ref 3.8–10.5)

## 2019-04-09 RX ORDER — ZINC SULFATE TAB 220 MG (50 MG ZINC EQUIVALENT) 220 (50 ZN) MG
1 TAB ORAL
Qty: 0 | Refills: 0 | COMMUNITY

## 2019-04-09 RX ORDER — VALACYCLOVIR 500 MG/1
1 TABLET, FILM COATED ORAL
Qty: 0 | Refills: 0 | COMMUNITY

## 2019-04-09 RX ORDER — ASCORBIC ACID 60 MG
1 TABLET,CHEWABLE ORAL
Qty: 0 | Refills: 0 | COMMUNITY

## 2019-04-09 RX ORDER — CHOLECALCIFEROL (VITAMIN D3) 125 MCG
1 CAPSULE ORAL
Qty: 0 | Refills: 0 | COMMUNITY

## 2019-04-09 NOTE — H&P PST ADULT - NSANTHOSAYNRD_GEN_A_CORE
Had recent sleep study done, pending results - [facility]  - [address]/No. EMMY screening performed.  STOP BANG Legend: 0-2 = LOW Risk; 3-4 = INTERMEDIATE Risk; 5-8 = HIGH Risk

## 2019-04-09 NOTE — H&P PST ADULT - HISTORY OF PRESENT ILLNESS
54y/o female presents for preop eval with c/o nasal obstruction.  Preop dx deviated nasal septum & hypertrophy of nasal turbinates.  Scheduled for septoplasty, b/l turbinectomy on 4/23/19.

## 2019-04-09 NOTE — H&P PST ADULT - NSICDXPROBLEM_GEN_ALL_CORE_FT
deviated nasal septum  scheduled for septoplasty, b/l turbinectomy on 4/23/19  Preop instructions, gi prophylaxis given  pt verbalized understanding  ucg on admit deviated nasal septum  scheduled for septoplasty, b/l turbinectomy on 4/23/19  Preop instructions, gi prophylaxis given  pt verbalized understanding  ucg on admit.  EMMY precautions recommended & PCN allergy  OR booking notified via fax.

## 2019-04-09 NOTE — H&P PST ADULT - NSICDXFAMILYHX_GEN_ALL_CORE_FT
FAMILY HISTORY:  Family history of breast cancer in female, mother  Family history of lung cancer, mother & father

## 2019-04-12 ENCOUNTER — TRANSCRIPTION ENCOUNTER (OUTPATIENT)
Age: 54
End: 2019-04-12

## 2019-04-23 ENCOUNTER — APPOINTMENT (OUTPATIENT)
Dept: OTOLARYNGOLOGY | Facility: HOSPITAL | Age: 54
End: 2019-04-23

## 2019-04-24 ENCOUNTER — APPOINTMENT (OUTPATIENT)
Dept: OTOLARYNGOLOGY | Facility: CLINIC | Age: 54
End: 2019-04-24

## 2019-06-19 ENCOUNTER — APPOINTMENT (OUTPATIENT)
Dept: PULMONOLOGY | Facility: CLINIC | Age: 54
End: 2019-06-19
Payer: COMMERCIAL

## 2019-06-19 VITALS
BODY MASS INDEX: 27.28 KG/M2 | HEIGHT: 61.61 IN | DIASTOLIC BLOOD PRESSURE: 82 MMHG | HEART RATE: 78 BPM | OXYGEN SATURATION: 98 % | SYSTOLIC BLOOD PRESSURE: 122 MMHG | RESPIRATION RATE: 16 BRPM | WEIGHT: 146.38 LBS | TEMPERATURE: 97.5 F

## 2019-06-19 PROCEDURE — 99205 OFFICE O/P NEW HI 60 MIN: CPT

## 2019-06-20 PROBLEM — J34.2 DEVIATED NASAL SEPTUM: Chronic | Status: ACTIVE | Noted: 2019-04-09

## 2019-07-22 NOTE — ED PROVIDER NOTE - MUSCULOSKELETAL NEGATIVE STATEMENT, MLM
Strong peripheral pulses no back pain, no gout, no musculoskeletal pain, no neck pain, and no weakness.

## 2019-08-07 NOTE — REVIEW OF SYSTEMS
[Thyroid Disease] : thyroid disease [Fatigue] : no fatigue [Nasal Congestion] : no nasal congestion [Postnasal Drip] : no postnasal drip [Chest Pain] : no chest pain [Shortness Of Breath] : no shortness of breath [Palpitations] : no palpitations [Edema] : ~T edema was not present [Obesity] : not obese [Anemia] : no anemia [Diabetes] : no diabetes  [History of Iron Deficiency] : no history of iron deficiency [Heartburn] : no heartburn [Arthralgias] : no arthralgias [Nocturia] : no nocturia [Depression] : no depression [Anxious] : not anxious [FreeTextEntry8] : Patient is reconsidering undergoing septoplasty and turbinectomy, having researched the procedures.  She was advised to consult with Dr. Garcia.

## 2019-08-07 NOTE — PHYSICAL EXAM
[Well Groomed] : well groomed [General Appearance - In No Acute Distress] : no acute distress [Normal Appearance] : normal appearance [Normal Conjunctiva] : the conjunctiva exhibited no abnormalities [IV] : IV [Neck Appearance] : the appearance of the neck was normal [Murmurs] : no murmurs [Heart Rate And Rhythm] : heart rate was normal and rhythm regular [] : no respiratory distress [No Focal Deficits] : no focal deficits [Auscultation Breath Sounds / Voice Sounds] : lungs were clear to auscultation bilaterally [Mood] : the mood was normal [Affect] : the affect was normal [FreeTextEntry2] : no edema

## 2019-08-07 NOTE — CONSULT LETTER
[Courtesy Letter:] : I had the pleasure of seeing your patient, [unfilled], in my office today. [Dear  ___] : Dear  [unfilled], [Please see my note below.] : Please see my note below. [Sincerely,] : Sincerely, [DrMarkie  ___] : Dr. PEREZ [FreeTextEntry2] : Dr. Kane Isaac\par  [FreeTextEntry3] : JIA Rain\par Physician Assistant

## 2019-08-07 NOTE — ASSESSMENT
[FreeTextEntry1] : 53 year old JESSICA JUAREZ with benign goiter and severely deviated nasal septum; was referred by Dr. Garcia for management of recently diagnosed severe obstructive sleep apnea.\par \par The patient has multiple signs and symptoms of sleep-disordered breathing including crowded oropharynx, witnessed apneas, snoring, and occasional nocturnal gasping and morning dry mouth.  She was diagnosed with severe EMMY based on polysomnographic sleep study in April.  \par \par The ramifications of EMMY and its potential therapeutic modalities were discussed with the patient.  The patient agreed to pursue CPAP as first line therapy for severe EMMY.  She was referred to the [facility] for a CPAP titration sleep study, and will follow up by phone 2-weeks after the sleep study if she has not been contacted by then.\par \par She was advised to consult with Dr. Garcia about her concerns with septoplasty turbinectomy surgery.

## 2019-08-07 NOTE — ADDENDUM
[FreeTextEntry1] : Patient's insurance denied in-lab titration for lack of significant comorbidity.  Order entered to initiate APAP therapy.  Follow-up visit in 2-months.

## 2019-08-07 NOTE — HISTORY OF PRESENT ILLNESS
[Date: ___] : Date of most recent diagnostic polysomnogram: [unfilled] [AHI: ___ per hour] : Apnea-hypopnea index:  [unfilled] per hour [T90%: ___] : T90%: [unfilled]% [Jose desatuation%: ___] : Jose desaturation:  [unfilled]% [Snoring] : snoring [Witnessed Apneas] : witnessed sleep apnea [Unintentional Sleep While Inactive] : unintentional sleep while inactive [Awakening With Dry Mouth] : awakening with dry mouth [Lower Extremity Discomfort] : lower extremity discomfort in evening or at bedtime [FreeTextEntry1] : This 53 year old female was referred by Dr. Rashad Garcia for management of recently diagnosed severe obstructive sleep apnea. \par \par COMORBID:  Benign goiter, severely deviated nasal septum.  History of palatoplasty for cleft palate in childhood.\par \par The patient was referred for a diagnostic sleep study for reported snoring, witnessed apneas and fatigue during a preop evaluation by Dr. Rashad Garcia prior to planned septoplasty, turbinectomy for severely deviated nasal septum.  She also reports occasional nocturnal gasping, occasionally awakening with dry mouth.  The patient was diagnosed with severe EMMY with AHI of 41.9.\par \par She typically sleeps 8-9hours between 11PM-8AM.  Rarely she may doze off during the day for ~10 minutes only while reading.  No drowsy driving.  The cat disturbs her sleep.  \par \par She experiences "tightness in her legs" at bedtime, with the need to stretch.  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  She is a former smoker, having discontinued 23 years ago.  She consumes 1-2 cups of caffeinated coffee in the morning.  No alcohol or drug history. [Frequent Nocturnal Awakening] : no nocturnal awakening [Daytime Somnolence] : no daytime somnolence [Unintentional Sleep while Active] : no unintentional sleep while active [Awakes Unrefreshed] : does not awake unrefreshed [Recent  Weight Gain] : no recent weight gain [Awakes with Headache] : no headache upon awakening [DMS] : no DMS [DIS] : no DIS [Unusual Sleep Behavior] : no unusual sleep behavior

## 2019-08-08 ENCOUNTER — APPOINTMENT (OUTPATIENT)
Dept: SLEEP CENTER | Facility: CLINIC | Age: 54
End: 2019-08-08

## 2019-08-16 NOTE — ED PROVIDER NOTE - TOBACCO USE
SUBJECTIVE:   Malka Guillermo is a 50year old female No obstetric history on file. for annual well woman exam.   No LMP recorded. Patient is perimenopausal.     Menstrual history: regular  GYN History:Current Contraception:vasectomy , Pap History:normal  Date: 2018 and HPV negative , Sexual History: activeno and Sexually Transmitted Infections:  No   Date of last mammogram: last year   Result of mammogram: no cancer     Date of DEXA: never     Date of last Colonoscopy:never    Result of Colonoscopy: never       Does patient exercise? Yes   How many times per week? 5    Was counseling given: yes     Does patient desire TDAP vaccine today: no     Domestic Violence Screening:  Do you feel safe at home and in your current relationship? Yes       Depression Screening:  Over the past 2 weeks, has patient felt down, depressed or hopeless? No   Over the past 2 weeks, has patient felt little interest or pleasure in doing things?  No     On the basis of the above screen, the following is initiated:  None     Social History:   Social History     Socioeconomic History   â¢ Marital status: /Civil Union     Spouse name: Not on file   â¢ Number of children: Not on file   â¢ Years of education: Not on file   â¢ Highest education level: Not on file   Occupational History   â¢ Occupation: teacher    Social Needs   â¢ Financial resource strain: Not on file   â¢ Food insecurity:     Worry: Not on file     Inability: Not on file   â¢ Transportation needs:     Medical: Not on file     Non-medical: Not on file   Tobacco Use   â¢ Smoking status: Never Smoker   â¢ Smokeless tobacco: Never Used   Substance and Sexual Activity   â¢ Alcohol use: Never     Frequency: Never     Comment: occ   â¢ Drug use: Never   â¢ Sexual activity: Not on file   Lifestyle   â¢ Physical activity:     Days per week: Not on file     Minutes per session: Not on file   â¢ Stress: Not on file   Relationships   â¢ Social connections:     Talks on phone: Not on file     Gets together: Not on file     Attends [employer] service: Not on file     Active member of club or organization: Not on file     Attends meetings of clubs or organizations: Not on file     Relationship status: Not on file   â¢ Intimate partner violence:     Fear of current or ex partner: Not on file     Emotionally abused: Not on file     Physically abused: Not on file     Forced sexual activity: Not on file   Other Topics Concern   â¢ Not on file   Social History Narrative   â¢ Not on file     Past Medical History:   Diagnosis Date   â¢ Asthma    â¢ MS (multiple sclerosis) (CMS/[facility])      Past Surgical History:   Procedure Laterality Date   â¢ Dilation and curettage  2002   â¢ Foot surgery Right    â¢ Dante tooth extraction       Family History:   Family History   Problem Relation Age of Onset   â¢ Cancer Father        Allergies: ALLERGIES:  No Known Allergies    Current Outpatient Medications   Medication Sig Dispense Refill   â¢ Multiple Vitamins-Minerals (MULTIVITAMIN ADULT PO)      â¢ Probiotic Product (PROBIOTIC-10 PO)      â¢ butalbital-APAP-caffeine (FIORICET) -40 MG per tablet Take 1 tablet by mouth every 4 hours as needed for Pain. 30 tablet 0   â¢ Dimethyl Fumarate (TECFIDERA) 240 MG CAPSULE DELAYED RELEASE      â¢ Cholecalciferol (VITAMIN D PO)      â¢ albuterol (PROAIR HFA) 108 (90 Base) MCG/ACT inhaler INHALE 2 PUFFS BY MOUTH EVERY 6 HOURS AS NEEDED FOR WHEEZING     â¢ estrogen, conj,-medroxyprogesterone (PREMPRO) 0.625-2.5 MG per tablet Take 1 tablet by mouth daily. 30 tablet 9     No current facility-administered medications for this visit. ROS   No headaches, no unexplained chest pain, dyspnea, SOB, abdominal pain, bowel or bladder complaints. All other systems reviewed are negative. GYNE ROS:   Genitourinary: denies urgency, frequency, dysuria, incontinenceVaginal symptoms: none  Discharge described as: normal and physiologic.   Other associated symptoms: no    All other systems reviewed are negative    PREVENTATIVE MEDICINE:    Does patient desire Immunizations: no  Last Lipids: No results found for: LDLHDL    OBJECTIVE  Physical Exam  Vitals:    08/16/19 0901   BP: 128/80       [place] BMI is Body mass index is 26.71 kg/mÂ²., which is Abnormal       CONSTITUTIONAL:    Appearance: well-nourished, well developed, alert, in no acute distress    HENT   Head: normocephalic, atraumatic   Face: face within normal limits, no sinus tenderness on palpation, no hirsutism present, parotid glands within normal limits   Ears: external ears within normal limits   Nose: external nose normal in appearance, nares patent, nasal discharge absent   Mouth: appearance normal    NECK    Thyroid: gland size normal, nontender, no nodules or masses present on palpation    CHEST   Respiratory effort: breathing unlabored   Auscultation: normal breath sounds, no rales, no rhonchi    CARDIOVASCULAR   Heart: regular rate, normal rhythm, no murmurs present    BREASTS   Inspection of Breasts: breasts symmetrical, no skin changes, no discharge present   Palpation of Breasts and Axillae: no masses present on palpation, no breast tenderness   Axillary Lymph Nodes: no lymphadenopathy present    GASTROINTESTINAL   Abdominal Examination: abdomen nontender to palpation, normal bowel sounds, tone normal without rigidity or guarding, no masses present, umbilicus without lesions   Liver and Spleen: no hepatomegaly present, liver nontender to palpation   Hernias: no hernias present    GENITOURINARY   External Genitalia: normal appearance for age, no discharge present, no tenderness present, no inflammatory lesions present   Vagina: normal vaginal vault without central or paravaginal defects, no discharge present, no inflammatory lesions present, no masses present   Bladder: nontender to palpation   Urethral body: urethra palpation normal, urethra structural support normal   Cervix: appearance healthy, no lesions present, nontender to palpation, no bleeding present   Uterus: nontender to palpation, no masses present, position midline/midplane, mobility: normal   Adnexa: no adnexal tenderness present, no adnexal masses present   Perineum: perineum within normal limits, no evidence of trauma, no rashes or skin lesions present   Anus: anus within normal limits, no hemorrhoids present   Inguinal Lymph Nodes: no lymphadenopathy present      LYMPHATIC   Lymph Nodes: no other lymphadenopathy present    SKIN   General Inspection: no rashes present, no lesions present, no areas of discoloration    NEUROLOGIC/PSYCHIATRIC   Orientation: grossly oriented to person, place and time   Judgment and Insight: judgment and insight intact   Mood and Affect: mood normal, affect appropriate    ASSESSMENT/PLAN    - Health Care Maintenance: Pap smear obtained - no, mammogram ordered -yes screening gc/chlamydia obtained - no   - Discussed calcium and vitamin D intake to prevent osteoporosis. Recommended calcium fortified diet of at least 3 servings a day, Cardiovascular health being followed by PCP, Condom use and safe sex discussed, Dexa scan not indicated, Low cholesterol diet, Recommended mammograms yearly, Return for annual GYN exam in one year or earlier with any additional concerns. and RTC in one year or sooner as needed. - condom use safe sex counseling done  - Contraception:  with vasectomy   - RTC in one year or sooner as needed. Patient repeated all of the instructions and states she understands the plan of care.   Sami Webb MD Unknown if ever smoked

## 2019-09-02 PROBLEM — Z09 FOLLOW UP: Status: ACTIVE | Noted: 2017-08-11

## 2019-09-16 ENCOUNTER — FORM ENCOUNTER (OUTPATIENT)
Age: 54
End: 2019-09-16

## 2019-09-18 ENCOUNTER — APPOINTMENT (OUTPATIENT)
Dept: PULMONOLOGY | Facility: CLINIC | Age: 54
End: 2019-09-18
Payer: COMMERCIAL

## 2019-09-18 VITALS
RESPIRATION RATE: 15 BRPM | TEMPERATURE: 97.5 F | OXYGEN SATURATION: 97 % | BODY MASS INDEX: 27.94 KG/M2 | SYSTOLIC BLOOD PRESSURE: 112 MMHG | HEIGHT: 61 IN | DIASTOLIC BLOOD PRESSURE: 75 MMHG | WEIGHT: 148 LBS | HEART RATE: 71 BPM

## 2019-09-18 DIAGNOSIS — Z87.891 PERSONAL HISTORY OF NICOTINE DEPENDENCE: ICD-10-CM

## 2019-09-18 DIAGNOSIS — Z80.1 FAMILY HISTORY OF MALIGNANT NEOPLASM OF TRACHEA, BRONCHUS AND LUNG: ICD-10-CM

## 2019-09-18 PROCEDURE — 99214 OFFICE O/P EST MOD 30 MIN: CPT

## 2019-09-18 RX ORDER — MULTIVITAMIN
TABLET ORAL
Refills: 0 | Status: ACTIVE | COMMUNITY

## 2019-09-18 RX ORDER — BACILLUS COAGULANS/INULIN 1B-250 MG
CAPSULE ORAL
Refills: 0 | Status: ACTIVE | COMMUNITY

## 2019-09-18 RX ORDER — UBIDECARENONE/VIT E ACET 100MG-5
CAPSULE ORAL
Refills: 0 | Status: ACTIVE | COMMUNITY

## 2019-09-18 NOTE — PHYSICAL EXAM
[Well Groomed] : well groomed [Normal Appearance] : normal appearance [General Appearance - In No Acute Distress] : no acute distress [Normal Conjunctiva] : the conjunctiva exhibited no abnormalities [Low Lying Soft Palate] : low lying soft palate [Enlarged Base of the Tongue] : enlargement of the base of the tongue [IV] : IV [Heart Rate And Rhythm] : heart rate was normal and rhythm regular [Neck Appearance] : the appearance of the neck was normal [Murmurs] : no murmurs [] : no respiratory distress [Auscultation Breath Sounds / Voice Sounds] : lungs were clear to auscultation bilaterally [Involuntary Movements] : no involuntary movements were seen [No Focal Deficits] : no focal deficits [Affect] : the affect was normal [Mood] : the mood was normal [FreeTextEntry2] : no edema

## 2019-09-18 NOTE — REVIEW OF SYSTEMS
[Recent Wt Loss (___ Lbs)] : recent [unfilled] ~Ulb weight loss [Nasal Congestion] : no nasal congestion [Shortness Of Breath] : no shortness of breath [Chest Pain] : no chest pain [Palpitations] : no palpitations [Edema] : ~T edema was not present [Obesity] : not obese [Thyroid Disease] : no thyroid disease [Diabetes] : no diabetes  [Anemia] : no anemia [History of Iron Deficiency] : no history of iron deficiency [A.M. Headache] : no headache present upon awakening [Heartburn] : no heartburn [Nocturia] : no nocturia [Arthralgias] : no arthralgias [Depression] : no depression [Anxious] : not anxious [FreeTextEntry3] : weight watches [FreeTextEntry6] : Intermittent left upper back discomfort

## 2019-09-18 NOTE — HISTORY OF PRESENT ILLNESS
[T90%: ___] : T90%: [unfilled]% [AHI: ___ per hour] : Apnea-hypopnea index:  [unfilled] per hour [Jose desatuation%: ___] : Jose desaturation:  [unfilled]% [Date: ___] : the most recent therapeutic polysomnogram was completed [unfilled] [CPAP: ___ cmH2O] : CPAP: [unfilled] cmH2O [% Days used: ____] : Days used: [unfilled] % [Mean nightly usage: ___ hrs] : Mean nightly usage: [unfilled] hrs [% Days used > 4 hrs: ____] : Days used > 4 hrs: [unfilled] % [Therapy based AHI: ___ /hr] : Therapy based AHI: [unfilled] / hr [ESS: ___] : ESS score [unfilled] [Sleep Onset Latency: ___ minutes] : sleep onset latency of [unfilled] minutes reported [Nocturnal Awakenings: ___] : ~he/she~ typically has [unfilled] nocturnal awakenings [FreeTextEntry1] : 53 year old JESSICA JUAREZ presents for follow-up after initiating APAP therapy for severe obstructive sleep apnea.\par \par COMORBID: Benign goiter, severely deviated nasal septum. History of childhood palatoplasty for cleft palate.\par \par \par EMMY THERAPY:  The patient was diagnosed with severe EMMY in April, started APAP therapy in August without titration study, for signs and symptoms of snoring, witnessed apneas, fatigue, nocturnal gasping, morning dry mouth, crowded oropharynx and overweight on physical exam.  \par \par COMPLIANCE:  The patient reports nightly APAP use for 7-8 hours.\par BENEFIT:  With APAP, she notes less awakenings, feeling more alert, and not gasping.  She remarks "I love it".\par \par SLEEP:  She typically sleeps 7-8 hours between 10/11PM and 7AM, without daytime sleep.\par ACTIVITY LEVEL:  She works 7-hours daytime in [employer] between 9AM-6PM  \par \par INTERIM CHANGES:  Patient opted out of septoplasty, turbinectomy surgery for fear of empty nose syndrome.  She lost 13-lbs weight over 3-months through [employer] program.  No interim changes in medication. [Snoring] : no snoring [Frequent Nocturnal Awakening] : no nocturnal awakening [Witnessed Apneas] : no witnessed sleep apnea [Unintentional Sleep while Active] : no unintentional sleep while active [Daytime Somnolence] : no daytime somnolence [Unintentional Sleep While Inactive] : no unintentional sleep while inactive [Awakes Unrefreshed] : does not awake unrefreshed [Awakes with Headache] : no headache upon awakening [Awakening With Dry Mouth] : no dry mouth upon awakening [Recent  Weight Gain] : no recent weight gain [Nocturnal Oxygen] : The patient does not use nocturnal oxygen

## 2019-09-18 NOTE — ASSESSMENT
[FreeTextEntry1] : 53 year old JESSICA JUAREZ with Benign goiter, severely deviated nasal septum. History of childhood palatoplasty for cleft palate; derives benefit from APAP therapy for severe obstructive sleep apnea.\par \par A review of therapeutic and compliance data reveals usage on 97% of nights averaging 7 hours 37 minutes; effective APAP pressures at 4-20 cmH2O with therapy based AHI of 3.8, and no significant mask leak.   \par \par The patient is benefitting from APAP therapy with resolution of nocturnal gasping, improvement in sleep and daytime alertness.  She will continue APAP therapy at 4-20 cmH2O, and has been receiving renewal of supplies regularly. \par \par She will follow up in one year or sooner if needed.\par \par \par \par \par \par

## 2020-02-11 NOTE — H&P ADULT - NSHPPOADEEPVENOUSTHROMB_GEN_A_CORE
Quality 110: Preventive Care And Screening: Influenza Immunization: Influenza immunization was not ordered or administered, reason not given Detail Level: Detailed Quality 131: Pain Assessment And Follow-Up: Pain assessment using a standardized tool is documented as negative, no follow-up plan required no

## 2020-05-13 NOTE — DISCHARGE NOTE ADULT - VISION (WITH CORRECTIVE LENSES IF THE PATIENT USUALLY WEARS THEM):
Normal vision: sees adequately in most situations; can see medication labels, newsprint Partially impaired: cannot see medication labels or newsprint, but can see obstacles in path, and the surrounding layout; can count fingers at arm's length Double O-Z Flap Text: The defect edges were debeveled with a #15 scalpel blade.  Given the location of the defect, shape of the defect and the proximity to free margins a Double O-Z flap was deemed most appropriate.  Using a sterile surgical marker, an appropriate transposition flap was drawn incorporating the defect and placing the expected incisions within the relaxed skin tension lines where possible. The area thus outlined was incised deep to adipose tissue with a #15 scalpel blade.  The skin margins were undermined to an appropriate distance in all directions utilizing iris scissors.

## 2020-07-16 NOTE — PATIENT PROFILE ADULT. - NS SC CAGE ALCOHOL CUT DOWN
Fide states that Dr. Wahl placed an order for an abdominal ultrasound today which is being done as a preventative measure d/t family history of abdominal aneurysms.     Fide states that Deysi Pardo ordered a CT Chest/Abdomen on 7/24/20 to test for this as well.     She states that she wants to know if the CT of the chest/sbdomen is really necessary. \" We are not going to pay for both of these tests, its one or the other.\"  States that they would prefer to just have the ultrasound done if possible as the patient is asymptomatic and this is being done as a preventative measure.     Routed to Deysi Pardo NP to please advise.        no

## 2021-01-25 NOTE — ED ADULT NURSE NOTE - CAS DISCH CONDITION
Called patient to schedule consult.  Left voicemail for callback.  Office number provided.      PSR-if patient calls back please offer Wednesday 1/27/2021 [facility] with Dr. Ojeda.  Thank you!   Stable

## 2021-09-13 NOTE — ED PROVIDER NOTE - MEDICAL DECISION MAKING DETAILS
Name: Alexandra Watts ADMIT: 2021   : 1980  PCP: Alanna Win MD    MRN: [id number] LOS: 0 days   AGE/SEX: 41 y.o. female  ROOM: Room/bed info not found       Chief complaint left breast mass    Present Illness or Internal History:  Patient is a 41 y.o. female presents with a left breast mass.     Past Surgical History:  Past Surgical History:   Procedure Laterality Date   • DENTAL PROCEDURE     • WISDOM TOOTH EXTRACTION         Past Medical History:  Past Medical History:   Diagnosis Date   • Anxiety        Home Medications:  (Not in a hospital admission)      Allergies:  Clavulanic acid and Codeine    Family History:  Family History   Problem Relation Age of Onset   • Asthma Mother    • Cancer Maternal Grandfather    • Colon cancer Paternal Grandmother    • Breast cancer Neg Hx        Social History:  Social History     Tobacco Use   • Smoking status: Never Smoker   • Smokeless tobacco: Never Used   Substance Use Topics   • Alcohol use: No   • Drug use: No        Objective     Physical Exam:    No exam performed today,    Vital Signs  Temp:  [98.7 °F (37.1 °C)] 98.7 °F (37.1 °C)  Heart Rate:  [68] 68  Resp:  [16] 16  BP: (119)/(75) 119/75    Anticipated Surgical Procedure:  Ultrasound guided vacuum assisted left breast biopsy with clip placement    The risks, benefits and alternatives of this procedure have been discussed with the patient or responsible party: Yes        Alex Mac Jr., MD  21  14:42 EDT                                          
Sepsis protocol. Will obtain blood work, labs, cultures and re-eval. Tx with blood work, labs, medicate and re-eval.

## 2021-10-21 ENCOUNTER — APPOINTMENT (OUTPATIENT)
Dept: PULMONOLOGY | Facility: CLINIC | Age: 56
End: 2021-10-21
Payer: COMMERCIAL

## 2021-10-21 DIAGNOSIS — G47.33 OBSTRUCTIVE SLEEP APNEA (ADULT) (PEDIATRIC): ICD-10-CM

## 2021-10-21 DIAGNOSIS — Z99.89 DEPENDENCE ON OTHER ENABLING MACHINES AND DEVICES: ICD-10-CM

## 2021-10-21 PROCEDURE — 99213 OFFICE O/P EST LOW 20 MIN: CPT | Mod: 95

## 2021-10-21 RX ORDER — CHROMIUM 200 MCG
TABLET ORAL
Refills: 0 | Status: COMPLETED | COMMUNITY
End: 2021-10-21

## 2021-10-21 NOTE — HISTORY OF PRESENT ILLNESS
[Obstructive Sleep Apnea] : obstructive sleep apnea [Unintentional Sleep While Inactive] : unintentional sleep while inactive [Awakening With Dry Mouth] : awakening with dry mouth [Other Location: e.g. School (Enter Location, City,State)___] : at [unfilled], at the time of the visit. [Medical Office: ([facility])___] : at the medical office located in  [Verbal consent obtained from patient] : the patient, [unfilled] [FreeTextEntry1] : 55 year old JESSICA JUAREZ following up to 2019 visit, with severe obstructive sleep apnea treated with APAP since 2019, reports APAP malfunction.\par \par SEVERE EMMY (AHI 41.9) on APAP 4-20 cmH2O using a nasal pillows mask.\par \par DX\par •	PreTx: witnessed apnea, snoring, nocturnal gasping, fragmented sleep, nonrestorative sleep.  \par •	PSG 2019 (referred by Dr. Garcia): severe EMMY, mild-moderate O2 desaturation. \par •	AHI=41.9 T90=23% Lowest 82% sat.\par TX\par •	2019 Patient was referred for titration sleep study (denied by insurance).\par •	2019 AirSense 10 AutoSet from [employer].\par \par HX\par •	childhood palatoplasty for cleft palate, severely deviated nasal septum, benign goiter.\par \par PATIENT REPORTS	\par •	c/o an increasing loud humming noise from APAP motor x < 1 week.\par •	[employer] instructed patient to acquire a script for a new machine.\par •	APAP use 100% of nights, not gasping for air, improves breathing, can’t sleep without it.\par •	Sleeping 7-hours between 10:30pm-6:30am without daytime sleep.\par •	[place] is relocating to [place], will continue to f/u with our office for now.\par \par \par EPWORTH SLEEPINESS SCALE\par \par How likely are you to doze off or fall asleep in the situations described below, in contrast to feeling just tired?  This refers to your usual way of life in recent times.  Even if you haven't done some of these things recently, try to work out how they would have affected you.  Use the following scale to choose one most appropriate number for each situation.......Chance of dozin= never. 1= slight. 2= moderate. 3= high.\par \par CHANCE OF DOZING............SITUATION\par 2.............................................Sitting and reading\par 2.............................................Watching TV\par 0.............................................Sitting inactive in a public place (eg a theatre or a meeting)\par 1.............................................As a passenger in a car for an hour without a break\par 2.............................................Lying down to rest in the afternoon when circumstances permit\par 0.............................................Sitting and talking to someone\par 1.............................................Sitting quietly after lunch without alcohol\par 0.............................................In a car, while stopped for a few minutes in traffic\par \par 8............................................TOTAL ESS SCORE [Snoring] : no snoring [Witnessed Apneas] : no witnessed sleep apnea [Frequent Nocturnal Awakening] : no nocturnal awakening [Unintentional Sleep while Active] : no unintentional sleep while active [Awakes Unrefreshed] : does not awake unrefreshed [Awakes with Headache] : no headache upon awakening [Recent  Weight Gain] : no recent weight gain [Daytime Somnolence] : no daytime somnolence [ESS] : 8

## 2021-10-21 NOTE — REVIEW OF SYSTEMS
[Fatigue] : no fatigue [Recent Wt Loss (___ Lbs)] : no recent weight loss [Shortness Of Breath] : no shortness of breath [Chest Pain] : no chest pain [Palpitations] : no palpitations [Edema] : ~T edema was not present [Thyroid Disease] : no thyroid disease [Diabetes] : no diabetes  [Anemia] : no anemia [History of Iron Deficiency] : no history of iron deficiency [A.M. Headache] : no headache present upon awakening [Heartburn] : no heartburn [Nocturia] : no nocturia [Arthralgias] : no arthralgias [Depression] : no depression [Anxious] : not anxious [Difficulty Initiating Sleep] : no difficulty falling asleep [Difficulty Maintaining Sleep] : no difficulty maintaining sleep [Lower Extremity Discomfort] : no lower extremity discomfort [Unusual Sleep Behavior] : no unusual sleep behavior [FreeTextEntry8] : severely deviated nasal septum [de-identified] : current 143-lbs [de-identified] : DMS without CPAP [FreeTextEntry1] : 10:30 pm [FreeTextEntry2] : 6:30 am [FreeTextEntry3] : 1/2 hour [FreeTextEntry4] : 0 (3-4 dry mouth + gasping w/o CPAP)  [FreeTextEntry6] : 7 hours [FreeTextEntry7] : Denies

## 2021-10-21 NOTE — ASSESSMENT
[FreeTextEntry1] : 55 year old JESSICA JUAREZ on APAP therapy for obstructive sleep apnea since 2019, reports APAP malfunction; history of childhood palatoplasty for cleft palate, severely deviated nasal septum, benign goiter, .\par \par SEVERE EMMY  (AHI 41.9) on APAP 4-20* cmH2O using a nasal pillows mask.\par \par ·	Patient experiences clinical and subjective benefit from therapy: \par ·	Prior nocturnal gasping, fragmented sleep, snoring, apneas, + nonrestorative sleep have resolved.   \par ·	AHI reduced to within normal limits at 2.8. \par ·	[place] Sleepiness Scale score of 8 today.  \par ·	APAP downloaded Data:  Compliant 97% of days, for 7 hours 10 minutes on average \par ·	Therapeutic AHI 2.8 on pressures above*,  Acceptable mask leak.    \par \par ·	Patient's PAP motor is increasingly noisy and disrupting her sleep. She requests [employer].\par •	She is relocating to [place], will continue to f/u with our office for now.\par \par PLAN:\par ·	[employer] to replace broken APAP to continue therapy at current settings. Deliver to [place].\par ·	Follow-up in one year or sooner if needed.\par \par \par \par
